# Patient Record
Sex: FEMALE | Race: BLACK OR AFRICAN AMERICAN | NOT HISPANIC OR LATINO | Employment: OTHER | URBAN - METROPOLITAN AREA
[De-identification: names, ages, dates, MRNs, and addresses within clinical notes are randomized per-mention and may not be internally consistent; named-entity substitution may affect disease eponyms.]

---

## 2017-01-01 ENCOUNTER — HOSPITAL ENCOUNTER (EMERGENCY)
Facility: HOSPITAL | Age: 62
End: 2017-12-20
Attending: EMERGENCY MEDICINE | Admitting: EMERGENCY MEDICINE

## 2017-01-01 PROCEDURE — 92950 HEART/LUNG RESUSCITATION CPR: CPT

## 2017-01-01 PROCEDURE — 99283 EMERGENCY DEPT VISIT LOW MDM: CPT

## 2017-12-20 NOTE — ED PROVIDER NOTES
EMERGENCY DEPARTMENT ENCOUNTER    CHIEF COMPLAINT  Chief Complaint: Cardiac Arrest  History given by: EMS  History limited by: Acuity of condition  Room Number: 16/16  PMD: No primary care provider on file.      HPI:  Pt is a 62 y.o. female who presents per EMS with cardiac arrest. EMS reports the pt was assisted to the restroom about 30 minutes ago. She was then found down and EMS was called. CPR was started at that time. Pt received 3 epi and narcan per EMS PTA.     Location of Arrest - assisted  Witnessed Arrest - No  Bystander CPR - Yes  Time of Collapse - About 30 minutes ago  Time CPR Initiated - About 25 minutes ago  [facility] on Scene - EMS   Time on Scene - About 10 minutes ago  AED Used - No  Dispatch Time - About 10 minutes ago  EMS on Scene Time - About 0559   EMS Agency - Other  Initial Cardiac Rhythm - Asystole  ROSC in Field - No  Time of Arrival to [facility] ED - 0559  STEMI - No  Treatment Prior to Arrival - 3 epi and 1 narcan      PAST MEDICAL HISTORY  Active Ambulatory Problems     Diagnosis Date Noted   • No Active Ambulatory Problems     Resolved Ambulatory Problems     Diagnosis Date Noted   • No Resolved Ambulatory Problems     Past Medical History:   Diagnosis Date   • CHF (congestive heart failure)    • COPD (chronic obstructive pulmonary disease)    • Hepatitis C, acute    • Pulmonary fibrosis    • Respiratory failure    • STEMI (ST elevation myocardial infarction)    • Syncope        PAST SURGICAL HISTORY  History reviewed. No pertinent surgical history.    FAMILY HISTORY  History reviewed. No pertinent family history.    SOCIAL HISTORY  Social History     Social History   • Marital status: N/A     Spouse name: N/A   • Number of children: N/A   • Years of education: N/A     Occupational History   • Not on file.     Social History Main Topics   • Smoking status: Not on file   • Smokeless tobacco: Not on file   • Alcohol use No   • Drug use: No   • Sexual activity: No      Other Topics Concern   • Not on file     Social History Narrative   • No narrative on file       ALLERGIES  Review of patient's allergies indicates no known allergies.    REVIEW OF SYSTEMS  Review of Systems   Unable to perform ROS: Acuity of condition       PHYSICAL EXAM  ED Triage Vitals   Temp Pulse Resp BP SpO2   -- -- -- -- --             Temp src Heart Rate Source Patient Position BP Location FiO2 (%)   -- -- -- -- --              Physical Exam   Eyes:   Eyes are fixed, dilated; Negative doll's eye response   Cardiovascular:   No heart tones appreciated   Pulmonary/Chest:   No active respiratory effort   Neurological:   Neuro exam is unacessable due to pt's condition   Skin:   Skin cool and dry; No riga mortis       PROCEDURES  Procedures      PROGRESS AND CONSULTS  ED Course     0600 Time of Death Called      MEDICAL DECISION MAKING  Results were reviewed/discussed with the patient and they were also made aware of online access. Pt also made aware that some labs, such as cultures, will not be resulted during ER visit and follow up with PMD is necessary.     MDM  Number of Diagnoses or Management Options  Sudden death:      Amount and/or Complexity of Data Reviewed  Decide to obtain previous medical records or to obtain history from someone other than the patient: yes  Review and summarize past medical records: yes  Independent visualization of images, tracings, or specimens: yes           DIAGNOSIS  Final diagnoses:   Sudden death       DISPOSITION      Latest Documented Vital Signs:  As of 6:40 AM  BP-   HR-   Temp-   O2 sat-      --  Documentation assistance provided by janeen Smith for Dr. Tolentino.  Information recorded by the scribe was done at my direction and has been verified and validated by me.       June Smith  17 [id number]       Heath Tolentino MD  17 [phone]